# Patient Record
Sex: MALE | Race: WHITE | URBAN - METROPOLITAN AREA
[De-identification: names, ages, dates, MRNs, and addresses within clinical notes are randomized per-mention and may not be internally consistent; named-entity substitution may affect disease eponyms.]

---

## 2020-06-02 ENCOUNTER — TELEPHONE ENCOUNTER (OUTPATIENT)
Dept: URBAN - METROPOLITAN AREA CLINIC 35 | Facility: CLINIC | Age: 40
End: 2020-06-02

## 2020-06-02 RX ORDER — COLESEVELAM HYDROCHLORIDE 625 MG/1
3 TABLETS WITH MEALS TABLET, FILM COATED ORAL TWICE A DAY
Qty: 180 TABLET | Refills: 5 | OUTPATIENT
Start: 2016-06-06

## 2020-09-09 ENCOUNTER — OFFICE VISIT (OUTPATIENT)
Dept: URBAN - METROPOLITAN AREA CLINIC 31 | Facility: CLINIC | Age: 40
End: 2020-09-09

## 2020-09-09 VITALS
HEART RATE: 107 BPM | SYSTOLIC BLOOD PRESSURE: 118 MMHG | HEIGHT: 68 IN | DIASTOLIC BLOOD PRESSURE: 68 MMHG | WEIGHT: 156 LBS | BODY MASS INDEX: 23.64 KG/M2 | OXYGEN SATURATION: 98 %

## 2020-09-09 RX ORDER — OMEPRAZOLE 20 MG/1
TAKE 1 CAPSULE BY MOUTH  ONCE A DAY CAPSULE, DELAYED RELEASE ORAL
Qty: 90 | Refills: 2 | Status: ACTIVE | COMMUNITY

## 2020-09-09 RX ORDER — VITAMIN D 25 MCG
1 TABLET TAB ORAL ONCE A DAY
Status: ACTIVE | COMMUNITY

## 2020-09-09 RX ORDER — ZOLPIDEM TARTRATE 10 MG/1
1 TABLET AT BEDTIME AS NEEDED TABLET, FILM COATED ORAL ONCE A DAY
Status: ACTIVE | COMMUNITY

## 2020-09-09 RX ORDER — GARLIC 180 MG
TABLET, DELAYED RELEASE (ENTERIC COATED) ORAL
Status: ACTIVE | COMMUNITY

## 2020-09-09 RX ORDER — SYRINGE W-NDL, DISP,INSUL,2 ML 29 G X1/2"
AS DIRECTED SYRINGE, EMPTY DISPOSABLE MISCELLANEOUS
Qty: 3 SYRINGES | Refills: 12
Start: 2017-01-10

## 2020-09-09 RX ORDER — CYANOCOBALAMIN 1000 UG/ML
1 ML INJECTION INTRAMUSCULAR; SUBCUTANEOUS
Qty: 3 | Status: ACTIVE | COMMUNITY
Start: 2017-01-10

## 2020-09-09 RX ORDER — COLESEVELAM HYDROCHLORIDE 625 MG/1
3 TABLETS WITH MEALS TABLET, FILM COATED ORAL TWICE A DAY
Qty: 180 TABLET | Refills: 5 | Status: ACTIVE | COMMUNITY
Start: 2016-06-06

## 2020-09-09 RX ORDER — BIOTIN 5 MG
TABLET ORAL
Status: ACTIVE | COMMUNITY

## 2020-09-09 RX ORDER — COLESEVELAM HYDROCHLORIDE 625 MG/1
3 TABLETS WITH MEALS TABLET, FILM COATED ORAL TWICE A DAY
Qty: 180 TABLET | Refills: 5
Start: 2016-06-06

## 2020-09-09 RX ORDER — MULTIVITAMIN
CAPSULE ORAL
Status: ACTIVE | COMMUNITY

## 2020-09-09 RX ORDER — CYANOCOBALAMIN 1000 UG/ML
1 ML INJECTION INTRAMUSCULAR; SUBCUTANEOUS
Qty: 3 ML | Refills: 12
Start: 2017-01-10

## 2020-09-09 RX ORDER — SYRINGE W-NDL, DISP,INSUL,2 ML 29 G X1/2"
AS DIRECTED SYRINGE, EMPTY DISPOSABLE MISCELLANEOUS
Qty: 3 SYRINGE | Refills: 0 | Status: ACTIVE | COMMUNITY
Start: 2017-01-10

## 2020-09-09 NOTE — HPI-MIGRATED HPI
;     Crohns : Patient presents today for a follow-up office visit from 03/04/2020. He continues holding Entyvio. Patient has not yet completed his labs. He is currently taking Welchol 625 mg, 3 tablets, twice a day. He continues taking B-12 injections, once a month. Patient states that he is feeling well. He is currently having 1 bowel movement per day with normal and formed stools. Patient denies any blood or mucous in his stool. He denies melena. Patient requests refills of both the Welchol and B-12 injections, sent to confirmed pharmacy in EMR. In addition, patient requests that we send in a prescriptions to include syringes for his B-12 injections. ;

## 2020-09-10 ENCOUNTER — TELEPHONE ENCOUNTER (OUTPATIENT)
Dept: URBAN - METROPOLITAN AREA CLINIC 35 | Facility: CLINIC | Age: 40
End: 2020-09-10

## 2020-09-12 LAB
ABSOLUTE BASOPHILS: 32
ABSOLUTE EOSINOPHILS: 0
ABSOLUTE LYMPHOCYTES: 1985
ABSOLUTE MONOCYTES: 567
ABSOLUTE NEUTROPHILS: 7917
ALBUMIN/GLOBULIN RATIO: 1.7
ALBUMIN: 4.5
ALKALINE PHOSPHATASE: 128
ALT: 36
AST: 27
BASOPHILS: 0.3
BILIRUBIN, TOTAL: 0.7
BUN/CREATININE RATIO: (no result)
C-REACTIVE PROTEIN: 4.7
CALCIUM: 9.9
CARBON DIOXIDE: 27
CHLORIDE: 101
CREATININE: 0.97
EGFR AFRICAN AMERICAN: 113
EGFR NON-AFR. AMERICAN: 97
EOSINOPHILS: 0
GLOBULIN: 2.6
GLUCOSE: 90
HEMATOCRIT: 47
HEMOGLOBIN: 16.3
LYMPHOCYTES: 18.9
MCH: 29.6
MCHC: 34.7
MCV: 85.3
MONOCYTES: 5.4
MPV: 9.8
NEUTROPHILS: 75.4
PLATELET COUNT: 361
POTASSIUM: 4.2
PROTEIN, TOTAL: 7.1
RDW: 13
RED BLOOD CELL COUNT: 5.51
SODIUM: 137
UREA NITROGEN (BUN): 9
VITAMIN B12: 456
VITAMIN D, 1,25 (OH)2,: 82
VITAMIN D2, 1,25 (OH)2: <8
VITAMIN D3, 1,25 (OH)2: 82
WHITE BLOOD CELL COUNT: 10.5

## 2020-10-05 ENCOUNTER — ERX REFILL RESPONSE (OUTPATIENT)
Dept: URBAN - METROPOLITAN AREA CLINIC 13 | Facility: CLINIC | Age: 40
End: 2020-10-05

## 2020-10-05 RX ORDER — OMEPRAZOLE 20 MG/1
TAKE 1 CAPSULE BY MOUTH  ONCE A DAY CAPSULE, DELAYED RELEASE ORAL
Qty: 90 | Refills: 2

## 2020-12-07 ENCOUNTER — TELEPHONE ENCOUNTER (OUTPATIENT)
Dept: URBAN - METROPOLITAN AREA CLINIC 35 | Facility: CLINIC | Age: 40
End: 2020-12-07

## 2020-12-07 RX ORDER — COLESEVELAM HYDROCHLORIDE 625 MG/1
3 TABLETS WITH MEALS TABLET, FILM COATED ORAL TWICE A DAY
Qty: 180 TABLET | Refills: 5 | OUTPATIENT
Start: 2016-06-06

## 2021-01-19 ENCOUNTER — OFFICE VISIT (OUTPATIENT)
Dept: URBAN - METROPOLITAN AREA CLINIC 31 | Facility: CLINIC | Age: 41
End: 2021-01-19

## 2021-01-19 VITALS — HEIGHT: 68 IN | WEIGHT: 159 LBS | BODY MASS INDEX: 24.1 KG/M2

## 2021-01-19 RX ORDER — MULTIVITAMIN
CAPSULE ORAL
Status: ACTIVE | COMMUNITY

## 2021-01-19 RX ORDER — SYRINGE W-NDL, DISP,INSUL,2 ML 29 G X1/2"
AS DIRECTED SYRINGE, EMPTY DISPOSABLE MISCELLANEOUS
Qty: 3 SYRINGES | Refills: 12 | Status: ACTIVE | COMMUNITY
Start: 2017-01-10

## 2021-01-19 RX ORDER — VITAMIN D 25 MCG
1 TABLET TAB ORAL ONCE A DAY
Status: ACTIVE | COMMUNITY

## 2021-01-19 RX ORDER — GARLIC 180 MG
TABLET, DELAYED RELEASE (ENTERIC COATED) ORAL
Status: ACTIVE | COMMUNITY

## 2021-01-19 RX ORDER — OMEPRAZOLE 20 MG/1
TAKE 1 CAPSULE BY MOUTH  ONCE A DAY CAPSULE, DELAYED RELEASE ORAL
Qty: 90 | Refills: 2 | Status: ACTIVE | COMMUNITY

## 2021-01-19 RX ORDER — CYANOCOBALAMIN 1000 UG/ML
1 ML INJECTION INTRAMUSCULAR; SUBCUTANEOUS
Qty: 3 ML | Refills: 12 | Status: ACTIVE | COMMUNITY
Start: 2017-01-10

## 2021-01-19 RX ORDER — OMEPRAZOLE 20 MG/1
TAKE 1 CAPSULE BY MOUTH  ONCE A DAY CAPSULE, DELAYED RELEASE ORAL
Qty: 90 | Refills: 2 | Status: DISCONTINUED | COMMUNITY

## 2021-01-19 RX ORDER — BIOTIN 5 MG
TABLET ORAL
Status: ACTIVE | COMMUNITY

## 2021-01-19 RX ORDER — COLESEVELAM HYDROCHLORIDE 625 MG/1
3 TABLETS WITH MEALS TABLET, FILM COATED ORAL TWICE A DAY
Qty: 180 TABLET | Refills: 5 | Status: ACTIVE | COMMUNITY
Start: 2016-06-06

## 2021-01-19 RX ORDER — ZOLPIDEM TARTRATE 10 MG/1
1 TABLET AT BEDTIME AS NEEDED TABLET, FILM COATED ORAL ONCE A DAY
Status: ACTIVE | COMMUNITY

## 2021-01-19 NOTE — HPI-MIGRATED HPI
;     Crohns : Patient presents today for a follow-up office visit from 09/09/2020. Labs were completed on 09/09/2020 and the results are in EMR. He continues holding Entyvio. He is currently taking Welchol 625 mg, 3 tablets, twice a day. He continues taking B-12 injections, once a month, and Vitamin D supplementation. Patient started Stelara in December of 2020. His dermatologist put him on Stelara for psoriasis. Patient states that from a GI standpoint, he is feeling well. He is currently having 1 bowel movement per day with normal and formed stools. He denies blood or mucous in his stool. Patient denies melena. He has not been having any abdominal pain. Patient does not need any refills at this time.  No obvious change in bowel symptoms was noted with started Stelara.;

## 2021-03-16 ENCOUNTER — TELEPHONE ENCOUNTER (OUTPATIENT)
Dept: URBAN - METROPOLITAN AREA CLINIC 35 | Facility: CLINIC | Age: 41
End: 2021-03-16

## 2021-08-18 ENCOUNTER — TELEPHONE ENCOUNTER (OUTPATIENT)
Dept: URBAN - METROPOLITAN AREA CLINIC 35 | Facility: CLINIC | Age: 41
End: 2021-08-18

## 2021-08-18 RX ORDER — COLESEVELAM HYDROCHLORIDE 625 MG/1
3 TABLETS WITH MEALS TABLET, FILM COATED ORAL TWICE A DAY
Qty: 180 TABLET | Refills: 4 | OUTPATIENT
Start: 2016-06-06

## 2021-09-30 ENCOUNTER — TELEPHONE ENCOUNTER (OUTPATIENT)
Dept: URBAN - METROPOLITAN AREA CLINIC 35 | Facility: CLINIC | Age: 41
End: 2021-09-30

## 2021-09-30 RX ORDER — CYANOCOBALAMIN 1000 UG/ML
1 ML INJECTION INTRAMUSCULAR; SUBCUTANEOUS
Qty: 12 | Refills: 1 | OUTPATIENT
Start: 2017-01-10

## 2021-09-30 RX ORDER — SYRINGE W-NDL, DISP,INSUL,2 ML 29 G X1/2"
AS DIRECTED SYRINGE, EMPTY DISPOSABLE MISCELLANEOUS
Qty: 12 | Refills: 1 | OUTPATIENT
Start: 2017-01-10

## 2021-11-16 ENCOUNTER — ERX REFILL RESPONSE (OUTPATIENT)
Dept: URBAN - METROPOLITAN AREA CLINIC 35 | Facility: CLINIC | Age: 41
End: 2021-11-16

## 2021-11-16 RX ORDER — OMEPRAZOLE 20 MG/1
TAKE 1 CAPSULE BY MOUTH  ONCE DAILY CAPSULE, DELAYED RELEASE ORAL
Qty: 90 CAPSULE | Refills: 2 | OUTPATIENT

## 2021-11-16 RX ORDER — OMEPRAZOLE 20 MG/1
TAKE 1 CAPSULE BY MOUTH  ONCE A DAY CAPSULE, DELAYED RELEASE ORAL
Qty: 90 | Refills: 2 | OUTPATIENT

## 2022-01-24 ENCOUNTER — TELEPHONE ENCOUNTER (OUTPATIENT)
Dept: URBAN - METROPOLITAN AREA CLINIC 36 | Facility: CLINIC | Age: 42
End: 2022-01-24

## 2022-01-24 RX ORDER — COLESEVELAM HYDROCHLORIDE 625 MG/1
3 TABLETS WITH MEALS TABLET, FILM COATED ORAL TWICE A DAY
Qty: 540 | Refills: 0
Start: 2016-06-06

## 2022-04-01 ENCOUNTER — TELEPHONE ENCOUNTER (OUTPATIENT)
Dept: URBAN - METROPOLITAN AREA CLINIC 36 | Facility: CLINIC | Age: 42
End: 2022-04-01

## 2022-04-01 RX ORDER — OMEPRAZOLE 20 MG/1
TAKE 1 CAPSULE BY MOUTH  ONCE DAILY CAPSULE, DELAYED RELEASE ORAL ONCE A DAY
Qty: 90 | Refills: 3

## 2022-04-13 ENCOUNTER — TELEPHONE ENCOUNTER (OUTPATIENT)
Dept: URBAN - METROPOLITAN AREA CLINIC 36 | Facility: CLINIC | Age: 42
End: 2022-04-13

## 2022-04-13 RX ORDER — COLESEVELAM HYDROCHLORIDE 625 MG/1
3 TABLETS WITH MEALS TABLET, FILM COATED ORAL TWICE A DAY
Qty: 540 | Refills: 0
Start: 2016-06-06

## 2022-04-20 ENCOUNTER — OFFICE VISIT (OUTPATIENT)
Dept: URBAN - METROPOLITAN AREA CLINIC 35 | Facility: CLINIC | Age: 42
End: 2022-04-20
Payer: COMMERCIAL

## 2022-04-20 VITALS
DIASTOLIC BLOOD PRESSURE: 72 MMHG | HEART RATE: 104 BPM | OXYGEN SATURATION: 98 % | HEIGHT: 68 IN | SYSTOLIC BLOOD PRESSURE: 128 MMHG | WEIGHT: 163 LBS | BODY MASS INDEX: 24.71 KG/M2

## 2022-04-20 DIAGNOSIS — E55.9 VITAMIN D DEFICIENCY, UNSPECIFIED: ICD-10-CM

## 2022-04-20 DIAGNOSIS — K50.00 CROHN'S DISEASE OF SMALL INTESTINE WITHOUT COMPLICATIONS: ICD-10-CM

## 2022-04-20 DIAGNOSIS — D51.9 VITAMIN B12 DEFICIENCY ANEMIA, UNSPECIFIED: ICD-10-CM

## 2022-04-20 DIAGNOSIS — R05.3 CHRONIC COUGH: ICD-10-CM

## 2022-04-20 PROCEDURE — 99213 OFFICE O/P EST LOW 20 MIN: CPT | Performed by: INTERNAL MEDICINE

## 2022-04-20 RX ORDER — GARLIC 180 MG
TABLET, DELAYED RELEASE (ENTERIC COATED) ORAL
Status: ACTIVE | COMMUNITY

## 2022-04-20 RX ORDER — COLESEVELAM HYDROCHLORIDE 625 MG/1
3 TABLETS WITH MEALS TABLET, FILM COATED ORAL TWICE A DAY
Qty: 540 | Refills: 0 | Status: ACTIVE | COMMUNITY
Start: 2016-06-06

## 2022-04-20 RX ORDER — ZOLPIDEM TARTRATE 10 MG/1
1 TABLET AT BEDTIME AS NEEDED TABLET, FILM COATED ORAL ONCE A DAY
Status: ACTIVE | COMMUNITY

## 2022-04-20 RX ORDER — OMEPRAZOLE 20 MG/1
TAKE 1 CAPSULE BY MOUTH  ONCE DAILY CAPSULE, DELAYED RELEASE ORAL ONCE A DAY
Qty: 90 | Refills: 3 | Status: ACTIVE | COMMUNITY

## 2022-04-20 RX ORDER — BIOTIN 5 MG
TABLET ORAL
Status: ACTIVE | COMMUNITY

## 2022-04-20 RX ORDER — SYRINGE W-NDL, DISP,INSUL,2 ML 29 G X1/2"
AS DIRECTED SYRINGE, EMPTY DISPOSABLE MISCELLANEOUS
Qty: 12 | Refills: 1 | Status: ACTIVE | COMMUNITY
Start: 2017-01-10

## 2022-04-20 RX ORDER — MULTIVITAMIN
CAPSULE ORAL
Status: ACTIVE | COMMUNITY

## 2022-04-20 RX ORDER — CYANOCOBALAMIN 1000 UG/ML
1 ML INJECTION INTRAMUSCULAR; SUBCUTANEOUS
Qty: 12 | Refills: 1 | Status: ACTIVE | COMMUNITY
Start: 2017-01-10

## 2022-04-20 RX ORDER — VITAMIN D 25 MCG
1 TABLET TAB ORAL ONCE A DAY
Status: ACTIVE | COMMUNITY

## 2022-04-20 NOTE — HPI-CROHNS
Patient presents today for a follow-up office visit from 01/19/2022. The [facility] portal was checked and patient's last labs done with Dr. Stafford were completed in September of 2020.  He is currently taking Welchol 625 mg, 3 tablets, twice a day. Patient continues taking B-12 injections, once a month, and Vitamin D supplementation. Patient takes the Stelara injections every 12 weeks and his last injection was done a couple of nights ago. Patient states that from a GI standpoint, he is feeling well. He is currently having 1-2 bowel movement per day with normal and formed stools. He denies blood or mucous in his stool. Patient denies melena. He has not been having any abdominal pain, skin rashes, aphthous stomatitis, or joint pain. Patient requests refills of Welchol 625 mg  3 tablets BID and the B-12 injections, both in a 90-day supply, sent to the [facility] pharmacy in EMR in the future, but does not need them right now.    Last labs with PCP appear to have been 4/26/2021:  , chol 151, LDL 70.  Glucose 83, BUN 10, Creat 1.07, Sodium 140, K 4.3, Calcium 9.7,  TP 6.7, AST 23, ALT 35, alkphos 119, WBC 9.1, hgb 15.8, hct 47, MCV 88, plt 336, neut 63.3%, TSH 3.37, HgbA1c 4.6.  Urinalysis - trace ketones, trace protein, leuk est. negative.  WBC none seen.

## 2022-04-26 ENCOUNTER — TELEPHONE ENCOUNTER (OUTPATIENT)
Dept: URBAN - METROPOLITAN AREA CLINIC 36 | Facility: CLINIC | Age: 42
End: 2022-04-26

## 2022-07-12 ENCOUNTER — TELEPHONE ENCOUNTER (OUTPATIENT)
Dept: URBAN - METROPOLITAN AREA CLINIC 36 | Facility: CLINIC | Age: 42
End: 2022-07-12

## 2022-07-12 RX ORDER — COLESEVELAM HYDROCHLORIDE 625 MG/1
3 TABLETS WITH MEALS TABLET, FILM COATED ORAL TWICE A DAY
Qty: 540 | Refills: 2
Start: 2016-06-06

## 2022-10-03 ENCOUNTER — TELEPHONE ENCOUNTER (OUTPATIENT)
Dept: URBAN - METROPOLITAN AREA CLINIC 36 | Facility: CLINIC | Age: 42
End: 2022-10-03

## 2022-10-03 RX ORDER — CYANOCOBALAMIN 1000 UG/ML
1 ML INJECTION INTRAMUSCULAR; SUBCUTANEOUS
Qty: 12 | Refills: 1
Start: 2017-01-10 | End: 2023-04-01

## 2022-10-19 ENCOUNTER — OFFICE VISIT (OUTPATIENT)
Dept: URBAN - METROPOLITAN AREA CLINIC 31 | Facility: CLINIC | Age: 42
End: 2022-10-19
Payer: COMMERCIAL

## 2022-10-19 VITALS — BODY MASS INDEX: 25.01 KG/M2 | WEIGHT: 165 LBS | HEIGHT: 68 IN | HEART RATE: 86 BPM | OXYGEN SATURATION: 99 %

## 2022-10-19 DIAGNOSIS — K50.00 CROHN'S DISEASE OF SMALL INTESTINE WITHOUT COMPLICATIONS: ICD-10-CM

## 2022-10-19 DIAGNOSIS — D51.9 VITAMIN B12 DEFICIENCY ANEMIA, UNSPECIFIED: ICD-10-CM

## 2022-10-19 DIAGNOSIS — R05.3 CHRONIC COUGH: ICD-10-CM

## 2022-10-19 DIAGNOSIS — E55.9 VITAMIN D DEFICIENCY, UNSPECIFIED: ICD-10-CM

## 2022-10-19 PROBLEM — 34713006 VITAMIN D DEFICIENCY: Status: ACTIVE | Noted: 2017-01-10

## 2022-10-19 PROCEDURE — 99213 OFFICE O/P EST LOW 20 MIN: CPT | Performed by: INTERNAL MEDICINE

## 2022-10-19 RX ORDER — BIOTIN 5 MG
TABLET ORAL
Status: ACTIVE | COMMUNITY

## 2022-10-19 RX ORDER — COLESEVELAM HYDROCHLORIDE 625 MG/1
3 TABLETS WITH MEALS TABLET, FILM COATED ORAL TWICE A DAY
Qty: 540 | Refills: 2 | Status: ACTIVE | COMMUNITY
Start: 2016-06-06

## 2022-10-19 RX ORDER — COLESEVELAM HYDROCHLORIDE 625 MG/1
3 TABLETS WITH MEALS TABLET, FILM COATED ORAL TWICE A DAY
Qty: 540 | Refills: 5
Start: 2016-06-06

## 2022-10-19 RX ORDER — OMEPRAZOLE 20 MG/1
TAKE 1 CAPSULE BY MOUTH  ONCE DAILY CAPSULE, DELAYED RELEASE ORAL ONCE A DAY
Qty: 90 | Refills: 3 | Status: ACTIVE | COMMUNITY

## 2022-10-19 RX ORDER — VITAMIN D 25 MCG
1 TABLET TAB ORAL ONCE A DAY
Status: ACTIVE | COMMUNITY

## 2022-10-19 RX ORDER — ZOLPIDEM TARTRATE 10 MG/1
1 TABLET AT BEDTIME AS NEEDED TABLET, FILM COATED ORAL ONCE A DAY
Status: ACTIVE | COMMUNITY

## 2022-10-19 RX ORDER — CYANOCOBALAMIN 1000 UG/ML
1 ML INJECTION INTRAMUSCULAR; SUBCUTANEOUS
Qty: 12 | Refills: 5
Start: 2017-01-10 | End: 2024-04-11

## 2022-10-19 RX ORDER — SYRINGE W-NDL, DISP,INSUL,2 ML 29 G X1/2"
AS DIRECTED SYRINGE, EMPTY DISPOSABLE MISCELLANEOUS
Qty: 12 | Refills: 1 | Status: ACTIVE | COMMUNITY
Start: 2017-01-10

## 2022-10-19 RX ORDER — GARLIC 180 MG
TABLET, DELAYED RELEASE (ENTERIC COATED) ORAL
Status: ACTIVE | COMMUNITY

## 2022-10-19 RX ORDER — MULTIVITAMIN
CAPSULE ORAL
Status: ACTIVE | COMMUNITY

## 2022-10-19 RX ORDER — OMEPRAZOLE 20 MG/1
TAKE 1 CAPSULE BY MOUTH  ONCE DAILY CAPSULE, DELAYED RELEASE ORAL ONCE A DAY
Qty: 90 | Refills: 5

## 2022-10-19 RX ORDER — CYANOCOBALAMIN 1000 UG/ML
1 ML INJECTION INTRAMUSCULAR; SUBCUTANEOUS
Qty: 12 | Refills: 1 | Status: ACTIVE | COMMUNITY
Start: 2017-01-10 | End: 2023-04-01

## 2022-10-19 NOTE — HPI-CROHNS
Patient presents today for a follow-up office visit from 01/19/2022. He is currently taking Welchol 625 mg, 3 tablets, twice a day. Patient continues taking B-12 injections, once a month, and Vitamin D supplementation. Patient takes the Stelara injections every 12 weeks and his next stelara injection will be (10/19/2022) Patient states that from a GI standpoint, he is feeling well. He is currently having 1  bowel movement per day with normal and formed stools. Only occasional rare diarrhea at most.  He denies blood or mucous in his stool. Patient denies melena. He denies abdominal pain, skin rashes, aphthous stomatitis, or joint pain except for some bilateral knee pain without swelling. Patient requests refills of Welchol 625 mg  3 tablets BID and the B-12 injections, both in a 90-day supply, sent to the [facility] pharmacy in EMR in the future, but does not need them right now.

## 2022-10-19 NOTE — PHYSICAL EXAM GASTROINTESTINAL
Abdomen,  soft, nontender except for mild RLQ tenderness without rebound or guard, nondistended,  normal bowel sounds,  Liver and Spleen,  no hepatomegaly present

## 2023-04-19 ENCOUNTER — OFFICE VISIT (OUTPATIENT)
Dept: URBAN - METROPOLITAN AREA CLINIC 31 | Facility: CLINIC | Age: 43
End: 2023-04-19
Payer: COMMERCIAL

## 2023-04-19 VITALS
WEIGHT: 162 LBS | HEIGHT: 68 IN | BODY MASS INDEX: 24.55 KG/M2 | OXYGEN SATURATION: 98 % | DIASTOLIC BLOOD PRESSURE: 84 MMHG | SYSTOLIC BLOOD PRESSURE: 126 MMHG | HEART RATE: 114 BPM

## 2023-04-19 DIAGNOSIS — E55.9 VITAMIN D DEFICIENCY, UNSPECIFIED: ICD-10-CM

## 2023-04-19 DIAGNOSIS — R05.3 CHRONIC COUGH: ICD-10-CM

## 2023-04-19 DIAGNOSIS — K50.80 CROHN'S COLITIS: ICD-10-CM

## 2023-04-19 DIAGNOSIS — D51.9 VITAMIN B12 DEFICIENCY ANEMIA, UNSPECIFIED: ICD-10-CM

## 2023-04-19 PROCEDURE — 99214 OFFICE O/P EST MOD 30 MIN: CPT | Performed by: INTERNAL MEDICINE

## 2023-04-19 RX ORDER — SYRINGE W-NDL, DISP,INSUL,2 ML 29 G X1/2"
AS DIRECTED SYRINGE, EMPTY DISPOSABLE MISCELLANEOUS
Qty: 12 | Refills: 11
Start: 2017-01-10

## 2023-04-19 RX ORDER — OMEPRAZOLE 20 MG/1
TAKE 1 CAPSULE BY MOUTH  ONCE DAILY CAPSULE, DELAYED RELEASE ORAL ONCE A DAY
Qty: 90 | Refills: 5 | Status: ACTIVE | COMMUNITY

## 2023-04-19 RX ORDER — VITAMIN D 25 MCG
1 TABLET TAB ORAL ONCE A DAY
Status: ACTIVE | COMMUNITY

## 2023-04-19 RX ORDER — COLESEVELAM HYDROCHLORIDE 625 MG/1
3 TABLETS WITH MEALS TABLET, FILM COATED ORAL TWICE A DAY
Qty: 540 | Refills: 5 | Status: ACTIVE | COMMUNITY
Start: 2016-06-06

## 2023-04-19 RX ORDER — SYRINGE W-NDL, DISP,INSUL,2 ML 29 G X1/2"
AS DIRECTED SYRINGE, EMPTY DISPOSABLE MISCELLANEOUS
Qty: 12 | Refills: 1 | Status: ACTIVE | COMMUNITY
Start: 2017-01-10

## 2023-04-19 RX ORDER — BIOTIN 5 MG
TABLET ORAL
Status: ACTIVE | COMMUNITY

## 2023-04-19 RX ORDER — ZOLPIDEM TARTRATE 10 MG/1
1 TABLET AT BEDTIME AS NEEDED TABLET, FILM COATED ORAL ONCE A DAY
Status: ACTIVE | COMMUNITY

## 2023-04-19 RX ORDER — COLESEVELAM HYDROCHLORIDE 625 MG/1
3 TABLETS WITH MEALS TABLET, FILM COATED ORAL TWICE A DAY
Qty: 540 | Refills: 5

## 2023-04-19 RX ORDER — MULTIVITAMIN
CAPSULE ORAL
Status: ACTIVE | COMMUNITY

## 2023-04-19 RX ORDER — GARLIC 180 MG
TABLET, DELAYED RELEASE (ENTERIC COATED) ORAL
Status: ACTIVE | COMMUNITY

## 2023-04-19 RX ORDER — CYANOCOBALAMIN 1000 UG/ML
1 ML INJECTION INTRAMUSCULAR; SUBCUTANEOUS EVERY 2 WEEKS
Qty: 12 ML | Refills: 5

## 2023-04-19 RX ORDER — CYANOCOBALAMIN 1000 UG/ML
1 ML INJECTION INTRAMUSCULAR; SUBCUTANEOUS
Qty: 12 | Refills: 5 | Status: ACTIVE | COMMUNITY
Start: 2017-01-10 | End: 2024-04-11

## 2023-04-19 RX ORDER — OMEPRAZOLE 20 MG/1
TAKE 1 CAPSULE BY MOUTH  ONCE DAILY CAPSULE, DELAYED RELEASE ORAL ONCE A DAY
Qty: 90 | Refills: 5

## 2023-04-19 NOTE — HPI-CROHNS
Patient presents today for a follow-up office visit from 10/19/2022. He is currently taking Welchol 625 mg, 3 tablets, twice a day. Patient continues taking B-12 injections, once a month, and vitamin D supplementation. Patient takes the Stelara injections every 12 weeks and his next Stelara injection will be in July. He is currently having 1 bowel movement per day with normal and formed stools. Patient denies any flare symptoms. He denies blood or mucous in his stool. Patient denies melena. He denies abdominal pain, skin rashes, aphthous stomatitis, or joint pain except for some bilateral knee pain without swelling. He states that he has "some black and blue marks", (one on his left elbow area and one on his left knee), that appear to be taking longer than normal to go away. He does remember hitting his elbow. The marks have been there for 2 and 3 weeks. Patient requests refills of Welchol 625 mg  3 tablets BID and the B-12 injections, both in a 90-day supply, sent to the [facility] pharmacy in [facility]. He also needs refills of the B-12 injections.  Patient had physical examination earlier this year.  Vitamin B12 was only 212.  He is taking B12 once monthly.

## 2023-10-18 ENCOUNTER — OFFICE VISIT (OUTPATIENT)
Dept: URBAN - METROPOLITAN AREA CLINIC 35 | Facility: CLINIC | Age: 43
End: 2023-10-18
Payer: COMMERCIAL

## 2023-10-18 ENCOUNTER — DASHBOARD ENCOUNTERS (OUTPATIENT)
Age: 43
End: 2023-10-18

## 2023-10-18 VITALS
HEART RATE: 94 BPM | SYSTOLIC BLOOD PRESSURE: 112 MMHG | DIASTOLIC BLOOD PRESSURE: 78 MMHG | WEIGHT: 161 LBS | HEIGHT: 68 IN | OXYGEN SATURATION: 99 % | BODY MASS INDEX: 24.4 KG/M2

## 2023-10-18 DIAGNOSIS — K50.00 CROHN'S DISEASE OF SMALL INTESTINE WITHOUT COMPLICATIONS: ICD-10-CM

## 2023-10-18 DIAGNOSIS — D51.9 VITAMIN B12 DEFICIENCY ANEMIA, UNSPECIFIED: ICD-10-CM

## 2023-10-18 PROBLEM — 56689002 CROHN'S DISEASE OF SMALL INTESTINE: Status: ACTIVE | Noted: 2022-10-19

## 2023-10-18 PROCEDURE — 99214 OFFICE O/P EST MOD 30 MIN: CPT | Performed by: STUDENT IN AN ORGANIZED HEALTH CARE EDUCATION/TRAINING PROGRAM

## 2023-10-18 RX ORDER — OMEPRAZOLE 20 MG/1
TAKE 1 CAPSULE BY MOUTH  ONCE DAILY CAPSULE, DELAYED RELEASE ORAL ONCE A DAY
Qty: 90 | Refills: 5 | Status: ACTIVE | COMMUNITY

## 2023-10-18 RX ORDER — GARLIC 180 MG
AS DIRECTED TABLET, DELAYED RELEASE (ENTERIC COATED) ORAL ONCE A DAY
Status: ACTIVE | COMMUNITY

## 2023-10-18 RX ORDER — GLUCOSAMINE/MSM/CHONDROIT SULF 500-166.6
AS DIRECTED TABLET ORAL ONCE A DAY
Status: ACTIVE | COMMUNITY

## 2023-10-18 RX ORDER — VITAMIN D 25 MCG
1 TABLET TAB ORAL ONCE A DAY
Status: ACTIVE | COMMUNITY

## 2023-10-18 RX ORDER — USTEKINUMAB 45 MG/.5ML
AS DIRECTED INJECTION, SOLUTION SUBCUTANEOUS
Status: ACTIVE | COMMUNITY

## 2023-10-18 RX ORDER — SYRINGE W-NDL, DISP,INSUL,2 ML 29 G X1/2"
AS DIRECTED SYRINGE, EMPTY DISPOSABLE MISCELLANEOUS
Qty: 12 | Refills: 11
Start: 2017-01-10

## 2023-10-18 RX ORDER — CYANOCOBALAMIN 1000 UG/ML
1 ML INJECTION INTRAMUSCULAR; SUBCUTANEOUS EVERY 2 WEEKS
Qty: 6 | Refills: 4

## 2023-10-18 RX ORDER — ZOLPIDEM TARTRATE 10 MG/1
1 TABLET AT BEDTIME AS NEEDED TABLET, FILM COATED ORAL ONCE A DAY
Status: ACTIVE | COMMUNITY

## 2023-10-18 RX ORDER — SYRINGE W-NDL, DISP,INSUL,2 ML 29 G X1/2"
AS DIRECTED SYRINGE, EMPTY DISPOSABLE MISCELLANEOUS
Qty: 12 | Refills: 11 | Status: ACTIVE | COMMUNITY
Start: 2017-01-10

## 2023-10-18 RX ORDER — COLESEVELAM HYDROCHLORIDE 625 MG/1
3 TABLETS WITH MEALS TABLET, FILM COATED ORAL TWICE A DAY
Qty: 540 TABLET | Refills: 3

## 2023-10-18 RX ORDER — CYANOCOBALAMIN 1000 UG/ML
1 ML INJECTION INTRAMUSCULAR; SUBCUTANEOUS EVERY 2 WEEKS
Qty: 12 ML | Refills: 5 | Status: ACTIVE | COMMUNITY

## 2023-10-18 RX ORDER — COLESEVELAM HYDROCHLORIDE 625 MG/1
3 TABLETS WITH MEALS TABLET, FILM COATED ORAL TWICE A DAY
Qty: 540 | Refills: 5 | Status: ACTIVE | COMMUNITY

## 2023-10-18 RX ORDER — MULTIVITAMIN
CAPSULE ORAL
Status: ON HOLD | COMMUNITY

## 2023-10-18 RX ORDER — BIOTIN 5 MG
AS DIRECTED TABLET ORAL ONCE A DAY
Status: ACTIVE | COMMUNITY

## 2023-10-18 NOTE — HPI-TODAY'S VISIT:
Patient presents today for a routine follow up for Crohns disease s/p ileocececal resection 2010, Psoriasias currently on Stelara. Currently also takes welchol, B12 self injection monthly. Reports BM are good. Reports 1-2 per day and formed. Has tried weaning welchol and reports recurrent diarrhea. No blood in stool. Very infrequent abdominal pain. No weight loss. Intermittent pains in knees. No vision complaints. Follows with opthalmology. Gets flu, covid vaccinations. Takes ibuprofen multiple x per week.   Last Colonoscopy 10/07/2019 - Hemorrhoids third degree - Mild active distal ileal Crohn's Disease - Biopsy taken  Pathology report: 1. Small bowel, ileum - benign small intestinal mucosa with ulceration and acute and chronic inflammation in the ulcer   Last visit 04/19/2023 Patient presents today for a follow-up office visit from 10/19/2022. He is currently taking Welchol 625 mg, 3 tablets, twice a day. Patient continues taking B-12 injections, once a month, and vitamin D supplementation. Patient takes the Stelara injections every 12 weeks and his next Stelara injection will be in July. He is currently having 1 bowel movement per day with normal and formed stools. Patient denies any flare symptoms. He denies blood or mucous in his stool. Patient denies melena. He denies abdominal pain, skin rashes, aphthous stomatitis, or joint pain except for some bilateral knee pain without swelling. He states that he has "some black and blue marks", (one on his left elbow area and one on his left knee), that appear to be taking longer than normal to go away. He does remember hitting his elbow. The marks have been there for 2 and 3 weeks. Patient requests refills of Welchol 625 mg 3 tablets BID and the B-12 injections, both in a 90-day supply, sent to the [facility] pharmacy in EMR. He also needs refills of the B-12 injections.  Patient had physical examination earlier this year. Vitamin B12 was only 212. He is taking B12 once monthly.

## 2023-12-13 ENCOUNTER — TELEPHONE ENCOUNTER (OUTPATIENT)
Dept: URBAN - METROPOLITAN AREA CLINIC 35 | Facility: CLINIC | Age: 43
End: 2023-12-13

## 2023-12-13 RX ORDER — COLESEVELAM HYDROCHLORIDE 625 MG/1
3 TABLETS WITH MEALS TABLET, FILM COATED ORAL TWICE A DAY
Qty: 540 TABLET | Refills: 3

## 2024-10-07 ENCOUNTER — ERX REFILL RESPONSE (OUTPATIENT)
Dept: URBAN - METROPOLITAN AREA CLINIC 35 | Facility: CLINIC | Age: 44
End: 2024-10-07

## 2024-10-07 RX ORDER — COLESEVELAM HYDROCHLORIDE 625 MG/1
TAKE 3 TABLETS BY MOUTH TWO TIMES A DAY WITH MEALS TABLET, FILM COATED ORAL
Qty: 540 TABLET | Refills: 0 | OUTPATIENT

## 2024-10-07 RX ORDER — COLESEVELAM HYDROCHLORIDE 625 MG/1
3 TABLETS WITH MEALS TABLET, FILM COATED ORAL TWICE A DAY
Qty: 540 TABLET | Refills: 3 | OUTPATIENT

## 2024-10-23 ENCOUNTER — OFFICE VISIT (OUTPATIENT)
Dept: URBAN - METROPOLITAN AREA CLINIC 35 | Facility: CLINIC | Age: 44
End: 2024-10-23

## 2025-06-04 ENCOUNTER — ERX REFILL RESPONSE (OUTPATIENT)
Dept: URBAN - METROPOLITAN AREA CLINIC 35 | Facility: CLINIC | Age: 45
End: 2025-06-04

## 2025-06-04 RX ORDER — COLESEVELAM HYDROCHLORIDE 625 MG/1
TAKE THREE TABLETS BY MOUTH TWICE A DAY WITH A MEAL TABLET, FILM COATED ORAL
Qty: 540 TABLET | Refills: 0 | OUTPATIENT

## 2025-06-04 RX ORDER — COLESEVELAM HYDROCHLORIDE 625 MG/1
TAKE 3 TABLETS BY MOUTH TWO TIMES A DAY WITH MEALS TABLET, FILM COATED ORAL
Qty: 540 TABLET | Refills: 0 | OUTPATIENT

## 2025-07-10 ENCOUNTER — OFFICE VISIT (OUTPATIENT)
Dept: URBAN - METROPOLITAN AREA CLINIC 35 | Facility: CLINIC | Age: 45
End: 2025-07-10
Payer: COMMERCIAL

## 2025-07-10 ENCOUNTER — LAB OUTSIDE AN ENCOUNTER (OUTPATIENT)
Dept: URBAN - METROPOLITAN AREA CLINIC 35 | Facility: CLINIC | Age: 45
End: 2025-07-10

## 2025-07-10 DIAGNOSIS — K21.9 GASTROESOPHAGEAL REFLUX DISEASE, UNSPECIFIED WHETHER ESOPHAGITIS PRESENT: ICD-10-CM

## 2025-07-10 DIAGNOSIS — D51.9 VITAMIN B12 DEFICIENCY ANEMIA, UNSPECIFIED: ICD-10-CM

## 2025-07-10 DIAGNOSIS — Z12.11 SCREENING FOR COLON CANCER: ICD-10-CM

## 2025-07-10 DIAGNOSIS — K50.00 CROHN'S DISEASE OF SMALL INTESTINE WITHOUT COMPLICATIONS: ICD-10-CM

## 2025-07-10 PROBLEM — 235595009: Status: ACTIVE | Noted: 2025-07-10

## 2025-07-10 PROCEDURE — 99214 OFFICE O/P EST MOD 30 MIN: CPT | Performed by: STUDENT IN AN ORGANIZED HEALTH CARE EDUCATION/TRAINING PROGRAM

## 2025-07-10 RX ORDER — SOD SULF/POT CHLORIDE/MAG SULF 1.479 G
12 TABLETS THE FIRST DOSE THE EVENING BEFORE AND SECOND DOSE THE MORNING OF COLONOSCOPY TABLET ORAL TWICE A DAY
Qty: 24 | Refills: 0 | OUTPATIENT
Start: 2025-07-10 | End: 2025-07-11

## 2025-07-10 RX ORDER — CYANOCOBALAMIN 1000 UG/ML
1 ML INJECTION INTRAMUSCULAR; SUBCUTANEOUS EVERY 2 WEEKS
Qty: 6 | Refills: 4 | Status: ACTIVE | COMMUNITY

## 2025-07-10 RX ORDER — USTEKINUMAB 45 MG/.5ML
AS DIRECTED INJECTION, SOLUTION SUBCUTANEOUS
Status: ACTIVE | COMMUNITY

## 2025-07-10 RX ORDER — CYANOCOBALAMIN 1000 UG/ML
1 ML INJECTION INTRAMUSCULAR; SUBCUTANEOUS
Qty: 1 MILLILITER | Refills: 11
Start: 2025-07-10

## 2025-07-10 RX ORDER — GLUCOSAMINE/MSM/CHONDROIT SULF 500-166.6
AS DIRECTED TABLET ORAL ONCE A DAY
Status: ACTIVE | COMMUNITY

## 2025-07-10 RX ORDER — OMEPRAZOLE 20 MG/1
TAKE 1 CAPSULE BY MOUTH  ONCE DAILY CAPSULE, DELAYED RELEASE ORAL ONCE A DAY
Qty: 90 | Refills: 5 | Status: ACTIVE | COMMUNITY

## 2025-07-10 RX ORDER — KRILL/OM-3/DHA/EPA/PHOSPHO/AST 1000-170MG
AS DIRECTED CAPSULE ORAL ONCE A DAY
Status: ACTIVE | COMMUNITY

## 2025-07-10 RX ORDER — VITAMIN D 25 MCG
1 TABLET TAB ORAL ONCE A DAY
Status: ACTIVE | COMMUNITY

## 2025-07-10 RX ORDER — SYRINGE W-NDL, DISP,INSUL,2 ML 29 G X1/2"
AS DIRECTED INJECTION MONTHLY SYRINGE, EMPTY DISPOSABLE MISCELLANEOUS
Qty: 12 | Refills: 11
Start: 2025-07-10

## 2025-07-10 RX ORDER — COLESEVELAM HYDROCHLORIDE 625 MG/1
TAKE THREE TABLETS BY MOUTH TWICE A DAY WITH A MEAL TABLET, FILM COATED ORAL
Qty: 540 TABLET | Refills: 0 | Status: ACTIVE | COMMUNITY

## 2025-07-10 RX ORDER — SYRINGE W-NDL, DISP,INSUL,2 ML 29 G X1/2"
AS DIRECTED SYRINGE, EMPTY DISPOSABLE MISCELLANEOUS
Qty: 12 | Refills: 11 | Status: ACTIVE | COMMUNITY
Start: 2017-01-10

## 2025-07-10 RX ORDER — ZOLPIDEM TARTRATE 10 MG/1
1 TABLET AT BEDTIME AS NEEDED TABLET, FILM COATED ORAL ONCE A DAY
Status: ACTIVE | COMMUNITY

## 2025-07-10 RX ORDER — GARLIC 180 MG
AS DIRECTED TABLET, DELAYED RELEASE (ENTERIC COATED) ORAL ONCE A DAY
Status: ACTIVE | COMMUNITY

## 2025-07-10 NOTE — HPI-TODAY'S VISIT:
45-year old male patient presents for followup fo Crohn's disease sp ileocecal resection 2010. Hx Psoriasis on Stelara managed by dermatologist. Taking welchol twice daily chronically post surgery. Reports BM usually once daily. Rare abdominal pain. No nausea/emesis. Having increasing reflux symptoms. Middle of night awkaening with coughing, or in morning. Takes TUMS. No dysphagia. Taking omeprazole in evening, 20 mg. Has not been taking vitamin b12 past couple months.   PCP Labs: 05/22/2205 CBC: WBC 9.1 wnl, HGB 15.5 wnl, MCV 87.3 wnl, Platelets 313 wnl. CMP: Bilirubin 0.5 wnl, ALK Phos. 107 H, AST 21 wnl, ALT 24 wnl, Creatinine 1.02 wnl.  VIT B12 235  Last Visit 10- Patient presents today for a routine follow up for Crohns disease s/p ileocececal resection 2010, Psoriasias currently on Stelara. Currently also takes welchol, B12 self injection monthly. Reports BM are good. Reports 1-2 per day and formed. Has tried weaning welchol and reports recurrent diarrhea. No blood in stool. Very infrequent abdominal pain. No weight loss. Intermittent pains in knees. No vision complaints. Follows with opthalmology. Gets flu, covid vaccinations. Takes ibuprofen multiple x per week.   Last Colonoscopy 10/07/2019 - Hemorrhoids third degree - Mild active distal ileal Crohn's Disease - Biopsy taken  Pathology report: 1. Small bowel, ileum - benign small intestinal mucosa with ulceration and acute and chronic inflammation in the ulcer   Last visit 04/19/2023 Patient presents today for a follow-up office visit from 10/19/2022. He is currently taking Welchol 625 mg, 3 tablets, twice a day. Patient continues taking B-12 injections, once a month, and vitamin D supplementation. Patient takes the Stelara injections every 12 weeks and his next Stelara injection will be in July. He is currently having 1 bowel movement per day with normal and formed stools. Patient denies any flare symptoms. He denies blood or mucous in his stool. Patient denies melena. He denies abdominal pain, skin rashes, aphthous stomatitis, or joint pain except for some bilateral knee pain without swelling. He states that he has "some black and blue marks", (one on his left elbow area and one on his left knee), that appear to be taking longer than normal to go away. He does remember hitting his elbow. The marks have been there for 2 and 3 weeks. Patient requests refills of Welchol 625 mg 3 tablets BID and the B-12 injections, both in a 90-day supply, sent to the [facility] pharmacy in EMR. He also needs refills of the B-12 injections.  Patient had physical examination earlier this year. Vitamin B12 was only 212. He is taking B12 once monthly.

## 2025-08-07 ENCOUNTER — OFFICE VISIT (OUTPATIENT)
Dept: URBAN - METROPOLITAN AREA SURGERY CENTER 8 | Facility: SURGERY CENTER | Age: 45
End: 2025-08-07

## 2025-08-18 ENCOUNTER — OFFICE VISIT (OUTPATIENT)
Dept: URBAN - METROPOLITAN AREA SURGERY CENTER 8 | Facility: SURGERY CENTER | Age: 45
End: 2025-08-18
Payer: COMMERCIAL

## 2025-08-18 ENCOUNTER — CLAIMS CREATED FROM THE CLAIM WINDOW (OUTPATIENT)
Dept: URBAN - METROPOLITAN AREA CLINIC 4 | Facility: CLINIC | Age: 45
End: 2025-08-18
Payer: COMMERCIAL

## 2025-08-18 DIAGNOSIS — Z12.11 SCREENING FOR COLON CANCER: ICD-10-CM

## 2025-08-18 DIAGNOSIS — Z98.0 INTESTINAL BYPASS AND ANASTOMOSIS STATUS: ICD-10-CM

## 2025-08-18 DIAGNOSIS — K50.00 CROHN''S DISEASE OF SMALL INTESTINE WITHOUT COMPLICATION: ICD-10-CM

## 2025-08-18 DIAGNOSIS — K29.70 GASTRITIS, UNSPECIFIED, WITHOUT BLEEDING: ICD-10-CM

## 2025-08-18 DIAGNOSIS — K50.00 CROHN'S DISEASE OF SMALL INTESTINE WITHOUT COMPLICATIONS: ICD-10-CM

## 2025-08-18 DIAGNOSIS — K63.3 ULCER OF INTESTINE: ICD-10-CM

## 2025-08-18 DIAGNOSIS — K31.89 OTHER DISEASES OF STOMACH AND DUODENUM: ICD-10-CM

## 2025-08-18 DIAGNOSIS — K25.9 GASTRIC ULCER WITHOUT HEMORRHAGE, PERFORATION, OR OBSTRUCTION, UNSPECIFIED ULCER CHRONICITY: ICD-10-CM

## 2025-08-18 DIAGNOSIS — K29.70 GASTRITIS WITHOUT BLEEDING, UNSPECIFIED CHRONICITY, UNSPECIFIED GASTRITIS TYPE: ICD-10-CM

## 2025-08-18 PROCEDURE — 88305 TISSUE EXAM BY PATHOLOGIST: CPT | Performed by: PATHOLOGY

## 2025-08-18 PROCEDURE — 43239 EGD BIOPSY SINGLE/MULTIPLE: CPT | Performed by: STUDENT IN AN ORGANIZED HEALTH CARE EDUCATION/TRAINING PROGRAM

## 2025-08-18 PROCEDURE — 88312 SPECIAL STAINS GROUP 1: CPT | Performed by: PATHOLOGY

## 2025-08-18 PROCEDURE — 00813 ANES UPR LWR GI NDSC PX: CPT | Performed by: NURSE ANESTHETIST, CERTIFIED REGISTERED

## 2025-08-18 PROCEDURE — 45380 COLONOSCOPY AND BIOPSY: CPT | Performed by: STUDENT IN AN ORGANIZED HEALTH CARE EDUCATION/TRAINING PROGRAM

## 2025-08-18 RX ORDER — CYANOCOBALAMIN 1000 UG/ML
1 ML INJECTION INTRAMUSCULAR; SUBCUTANEOUS
Qty: 1 MILLILITER | Refills: 11 | Status: ACTIVE | COMMUNITY
Start: 2025-07-10

## 2025-08-18 RX ORDER — OMEPRAZOLE 20 MG/1
TAKE 1 CAPSULE BY MOUTH  ONCE DAILY CAPSULE, DELAYED RELEASE ORAL ONCE A DAY
Qty: 90 | Refills: 5 | Status: ACTIVE | COMMUNITY

## 2025-08-18 RX ORDER — USTEKINUMAB 45 MG/.5ML
AS DIRECTED INJECTION, SOLUTION SUBCUTANEOUS
Status: ACTIVE | COMMUNITY

## 2025-08-18 RX ORDER — KRILL/OM-3/DHA/EPA/PHOSPHO/AST 1000-170MG
AS DIRECTED CAPSULE ORAL ONCE A DAY
Status: ACTIVE | COMMUNITY

## 2025-08-18 RX ORDER — SYRINGE W-NDL, DISP,INSUL,2 ML 29 G X1/2"
AS DIRECTED INJECTION MONTHLY SYRINGE, EMPTY DISPOSABLE MISCELLANEOUS
Qty: 12 | Refills: 11 | Status: ACTIVE | COMMUNITY
Start: 2025-07-10

## 2025-08-18 RX ORDER — GARLIC 180 MG
AS DIRECTED TABLET, DELAYED RELEASE (ENTERIC COATED) ORAL ONCE A DAY
Status: ACTIVE | COMMUNITY

## 2025-08-18 RX ORDER — ZOLPIDEM TARTRATE 10 MG/1
1 TABLET AT BEDTIME AS NEEDED TABLET, FILM COATED ORAL ONCE A DAY
Status: ACTIVE | COMMUNITY

## 2025-08-18 RX ORDER — OMEPRAZOLE 20 MG/1
1 CAPSULE 1/2 TO 1 HOUR BEFORE BEFORE MEAL CAPSULE, DELAYED RELEASE ORAL TWICE A DAY
Qty: 180 | Refills: 1 | OUTPATIENT
Start: 2025-08-18

## 2025-08-18 RX ORDER — COLESEVELAM HYDROCHLORIDE 625 MG/1
TAKE THREE TABLETS BY MOUTH TWICE A DAY WITH A MEAL TABLET, FILM COATED ORAL
Qty: 540 TABLET | Refills: 0 | Status: ACTIVE | COMMUNITY

## 2025-08-18 RX ORDER — GLUCOSAMINE/MSM/CHONDROIT SULF 500-166.6
AS DIRECTED TABLET ORAL ONCE A DAY
Status: ACTIVE | COMMUNITY

## 2025-08-18 RX ORDER — VITAMIN D 25 MCG
1 TABLET TAB ORAL ONCE A DAY
Status: ACTIVE | COMMUNITY

## 2025-08-19 ENCOUNTER — TELEPHONE ENCOUNTER (OUTPATIENT)
Dept: URBAN - METROPOLITAN AREA CLINIC 35 | Facility: CLINIC | Age: 45
End: 2025-08-19

## 2025-08-20 ENCOUNTER — OFFICE VISIT (OUTPATIENT)
Dept: URBAN - METROPOLITAN AREA CLINIC 35 | Facility: CLINIC | Age: 45
End: 2025-08-20